# Patient Record
Sex: FEMALE | Race: ASIAN | Employment: UNEMPLOYED | URBAN - METROPOLITAN AREA
[De-identification: names, ages, dates, MRNs, and addresses within clinical notes are randomized per-mention and may not be internally consistent; named-entity substitution may affect disease eponyms.]

---

## 2022-06-20 ENCOUNTER — HOSPITAL ENCOUNTER (OUTPATIENT)
Facility: HOSPITAL | Age: 27
Discharge: ANOTHER HOSPITAL | End: 2022-06-21
Attending: OBSTETRICS & GYNECOLOGY | Admitting: OBSTETRICS & GYNECOLOGY
Payer: COMMERCIAL

## 2022-06-20 ENCOUNTER — HOSPITAL ENCOUNTER (EMERGENCY)
Facility: HOSPITAL | Age: 27
Discharge: HOME | End: 2022-06-21
Attending: EMERGENCY MEDICINE
Payer: COMMERCIAL

## 2022-06-20 DIAGNOSIS — J06.9 UPPER RESPIRATORY TRACT INFECTION, UNSPECIFIED TYPE: Primary | ICD-10-CM

## 2022-06-20 PROCEDURE — 99284 EMERGENCY DEPT VISIT MOD MDM: CPT

## 2022-06-20 RX ORDER — MULTIVIT-MIN/IRON FUM/FOLIC AC 7.5 MG-4
1 TABLET ORAL DAILY
COMMUNITY

## 2022-06-20 RX ORDER — BIOTIN 1 MG
1 TABLET ORAL DAILY
COMMUNITY

## 2022-06-20 RX ORDER — MELATONIN
325
COMMUNITY

## 2022-06-20 RX ORDER — CHOLECALCIFEROL (VITAMIN D3) 25 MCG
1 TABLET,CHEWABLE ORAL DAILY
COMMUNITY

## 2022-06-21 VITALS
SYSTOLIC BLOOD PRESSURE: 122 MMHG | DIASTOLIC BLOOD PRESSURE: 80 MMHG | OXYGEN SATURATION: 95 % | WEIGHT: 167.56 LBS | HEART RATE: 93 BPM | HEIGHT: 67 IN | BODY MASS INDEX: 26.3 KG/M2 | RESPIRATION RATE: 16 BRPM | TEMPERATURE: 98 F

## 2022-06-21 VITALS
HEART RATE: 98 BPM | WEIGHT: 167.56 LBS | HEIGHT: 67 IN | DIASTOLIC BLOOD PRESSURE: 68 MMHG | TEMPERATURE: 98 F | BODY MASS INDEX: 26.3 KG/M2 | SYSTOLIC BLOOD PRESSURE: 115 MMHG | OXYGEN SATURATION: 98 % | RESPIRATION RATE: 20 BRPM

## 2022-06-21 LAB — SARS-COV-2 RNA RESP QL NAA+PROBE: NOT DETECTED

## 2022-06-21 PROCEDURE — 59025 FETAL NON-STRESS TEST: CPT

## 2022-06-21 PROCEDURE — 99212 OFFICE O/P EST SF 10 MIN: CPT

## 2022-06-21 PROCEDURE — 94640 AIRWAY INHALATION TREATMENT: CPT

## 2022-06-21 RX ORDER — ALBUTEROL SULFATE 90 UG/1
2 AEROSOL, METERED RESPIRATORY (INHALATION) EVERY 4 HOURS PRN
Qty: 1 EACH | Refills: 0 | Status: SHIPPED | OUTPATIENT
Start: 2022-06-21 | End: 2022-07-21

## 2022-06-21 RX ORDER — ALBUTEROL SULFATE 2.5 MG/3ML
2.5 SOLUTION RESPIRATORY (INHALATION) ONCE
Status: COMPLETED | OUTPATIENT
Start: 2022-06-21 | End: 2022-06-21

## 2022-06-21 NOTE — ED INITIAL ASSESSMENT (HPI)
Pt presents to ER with SOB, cough, and heavy head. Pt is 38 weeks pregnant. Feeling baby move, but feeling less movement. Pt has hx of asthma. No irregular vaginal discharge. C/o lower back pain. Yes cramping. Unknown covid exposure.

## 2022-06-21 NOTE — PROGRESS NOTES
Pt is a 32year old female admitted to [facility]G5/[facility]G5-A. Patient presents with:  Decreased Fetal Movement: Patient here with c/o decreased fetal movement since 1900. Patient denies leaking of fluid or vaginal bleeding. She states she felt movement X 1 upon arrival to L&D unit. She receives her prental care with Dr. Jeannette Schmitt at [facility].      Pt is  38w0d intra-uterine pregnancy. History obtained. Oriented to room, staff, and plan of care.

## 2022-06-21 NOTE — PROGRESS NOTES
Written and verbal instructions given to patient and spouse, questions answered and verbalized understanding of teaching. Kick count information given and instructed on following up with their own OB, patient and spouse understand the importance of both. Patient discharged to the ED for f/u with patient's c/o of SOB via wheelchair, patient not in active labor.

## 2022-06-21 NOTE — ED QUICK NOTES
Family now concerned with decreased fetal movement. Mom states she last felt baby move at 200. L&D charge called, pt taken to L&D for monitoring. ED charge aware. Home hospital is [facility].

## 2022-06-21 NOTE — ED QUICK NOTES
Called L&D charge, she is aware of our 2 hour wait. They will come to do an NST once pt is in room but cannot do it in the waiting room. Pt and  aware.

## 2022-06-21 NOTE — NST
Nonstress Test   Patient: Melba Alvarez    Gestation: 38w1d    NST:       Variability: Moderate           Accelerations: Yes           Decelerations: None            Baseline: 135 BPM           Uterine Irritability: Yes           Contractions: Irregular                                        Acoustic Stimulator: No           Nonstress Test Interpretation: Reactive           Nonstress Test Second Interpretation: Reactive                     Additional Comments       Physician Evaluation      NST Interpretation: Reactive    Disposition:   Discharged    Comments:    none    Rosie Aceves MD    On call Laborist 06/21/22